# Patient Record
Sex: FEMALE | Race: BLACK OR AFRICAN AMERICAN | URBAN - METROPOLITAN AREA
[De-identification: names, ages, dates, MRNs, and addresses within clinical notes are randomized per-mention and may not be internally consistent; named-entity substitution may affect disease eponyms.]

---

## 2021-08-23 ENCOUNTER — OFFICE VISIT (OUTPATIENT)
Dept: URBAN - METROPOLITAN AREA CLINIC 23 | Facility: CLINIC | Age: 66
End: 2021-08-23
Payer: MEDICARE

## 2021-08-23 ENCOUNTER — DASHBOARD ENCOUNTERS (OUTPATIENT)
Age: 66
End: 2021-08-23

## 2021-08-23 ENCOUNTER — WEB ENCOUNTER (OUTPATIENT)
Dept: URBAN - METROPOLITAN AREA CLINIC 23 | Facility: CLINIC | Age: 66
End: 2021-08-23

## 2021-08-23 VITALS
WEIGHT: 162 LBS | TEMPERATURE: 98 F | BODY MASS INDEX: 26.03 KG/M2 | HEART RATE: 99 BPM | SYSTOLIC BLOOD PRESSURE: 136 MMHG | DIASTOLIC BLOOD PRESSURE: 82 MMHG | HEIGHT: 66 IN

## 2021-08-23 DIAGNOSIS — R10.84 ABDOMINAL CRAMPING, GENERALIZED: ICD-10-CM

## 2021-08-23 DIAGNOSIS — R63.4 WEIGHT LOSS: ICD-10-CM

## 2021-08-23 DIAGNOSIS — K59.01 CONSTIPATION: ICD-10-CM

## 2021-08-23 PROBLEM — 14760008 CONSTIPATION: Status: ACTIVE | Noted: 2021-08-23

## 2021-08-23 PROCEDURE — 74177 CT ABD & PELVIS W/CONTRAST: CPT | Performed by: INTERNAL MEDICINE

## 2021-08-23 PROCEDURE — 99244 OFF/OP CNSLTJ NEW/EST MOD 40: CPT | Performed by: INTERNAL MEDICINE

## 2021-08-23 PROCEDURE — 99204 OFFICE O/P NEW MOD 45 MIN: CPT | Performed by: INTERNAL MEDICINE

## 2021-08-23 RX ORDER — LEVOTHYROXINE SODIUM 0.05 MG/1
1 TABLET IN THE MORNING ON AN EMPTY STOMACH TABLET ORAL ONCE A DAY
Status: ACTIVE | COMMUNITY

## 2021-08-23 RX ORDER — LINACLOTIDE 290 UG/1
1 CAPSULE AT LEAST 30 MINUTES BEFORE THE FIRST MEAL OF THE DAY ON AN EMPTY STOMACH CAPSULE, GELATIN COATED ORAL ONCE A DAY
Status: ACTIVE | COMMUNITY

## 2021-08-23 RX ORDER — HYDROCHLOROTHIAZIDE 25 MG/1
1 TABLET IN THE MORNING TABLET ORAL ONCE A DAY
Status: ACTIVE | COMMUNITY

## 2021-08-23 RX ORDER — LISINOPRIL 40 MG/1
1 TABLET TABLET ORAL ONCE A DAY
Status: ACTIVE | COMMUNITY

## 2021-08-23 RX ORDER — FEBUXOSTAT 40 MG/1
1 TABLET TABLET ORAL ONCE A DAY
Status: ACTIVE | COMMUNITY

## 2021-08-23 RX ORDER — DAPAGLIFLOZIN 10 MG/1
1 TABLET TABLET, FILM COATED ORAL ONCE A DAY
Status: ACTIVE | COMMUNITY

## 2021-08-23 RX ORDER — GLIPIZIDE 5 MG/1
1 TABLET WITH FOOD TABLET, EXTENDED RELEASE ORAL ONCE A DAY
Status: ACTIVE | COMMUNITY

## 2021-08-23 RX ORDER — PAROXETINE HYDROCHLORIDE HEMIHYDRATE 30 MG/1
1 TABLET IN THE MORNING TABLET, FILM COATED ORAL ONCE A DAY
Status: ACTIVE | COMMUNITY

## 2021-08-23 NOTE — PHYSICAL EXAM PSYCH:
Telephone Encounter by Jatin Espinal MD at 09/07/17 08:35 AM     Author:  Jatin Espinal MD Service:  (none) Author Type:  Physician     Filed:  09/07/17 08:35 AM Encounter Date:  9/6/2017 Status:  Signed     :  Jatin Espinal MD (Physician)            [facility] for one refill[RH1.1M]      Revision History        User Key Date/Time User Provider Type Action    > RH1.1 09/07/17 08:35 AM Jatin Espinal MD Physician Sign    M - Manual normal mood with appropriate affect

## 2021-08-23 NOTE — PHYSICAL EXAM CHEST:
no lesions, no deformities, no traumatic injuries, no significant scars are present, chest wall non-tender, no masses present, tactile fremitus is normal, breathing is unlabored without accessory muscle use., normal breath sounds. , no lesions,  no deformities,  no traumatic injuries,  no significant scars are present,  chest wall non-tender, breathing is unlabored without accessory muscle use,normal breath sounds

## 2021-08-23 NOTE — HPI-TODAY'S VISIT:
67 yo female presenting for abdominal discomfort/gas and bloating referred by Dr. Paula Peñaloza .  A copy of this note will be sent to the referring physician.   -she feels that the symptoms started about 4 months ago generally -however prior to that has had longstanding constipation took pepcid which helped a little -started having pain in the mid abdomen- this is daily basis, it is a dull pain .  she states that it is improved with bm.  when she eats it is worse.  she doesn't notice any specific triggers.  she has tried bland food, still is present.  she feels that the pain is increasing -she was placed on linzess 290 which gave her diarrhea.  she hasn't yet gotten a lower dose- has not picked it up -started this about 60 days ago -she complains of a lot of bloating and gas- states that this is new for her as well.   she does feel that the linzess and resulting emptying helps the bloating gas but it comes pretty quickly  -no blood in the stool or black stool -she has gotten her routine bloodwork with Dr. Lamar, states that her thyroid has been checked -she has  her uterus and ovaries- sees gyn but hasn't this year -denies any family history of any intestinal issues -she states that there were no new prescriptions that she started before these symptoms started -takes melatonin to help her sleep which is new  -she has held her low dose asa, farxiga per discussion with Dr. Lamar which didn't help - she had a colonoscopy  6 years ago- diverticulosis was seen and no other findings -she feels that the pepcid chewable that she is nydia twice a day has helped /gotten rid of the pain altogether -she states that she has lost 94 pounds over 1 years - initially on purpose but states that it continued without intention -feels that she doesn't have the same appetite- when she does eat she has the pain later on

## 2021-08-23 NOTE — PHYSICAL EXAM GASTROINTESTINAL
Abdomen,  soft, diffusely tender to palpation with voluntary guarding , patient visibly uncomfortable, nondistended,  no guarding or rigidity,  no masses palpable,  normal bowel sounds , Abdomen , soft, nontender, nondistended , no guarding or rigidity , no masses palpable , normal bowel sounds , Liver and Spleen , no hepatomegaly present , no hepatosplenomegaly , liver nontender , spleen not palpable

## 2021-08-26 LAB
DEAMIDATED GLIADIN ABS, IGA: 5
DEAMIDATED GLIADIN ABS, IGG: 2
ENDOMYSIAL ANTIBODY IGA: NEGATIVE
IMMUNOGLOBULIN A, QN, SERUM: 304
T-TRANSGLUTAMINASE (TTG) IGA: <2
T-TRANSGLUTAMINASE (TTG) IGG: <2

## 2021-08-30 ENCOUNTER — TELEPHONE ENCOUNTER (OUTPATIENT)
Dept: URBAN - METROPOLITAN AREA CLINIC 92 | Facility: CLINIC | Age: 66
End: 2021-08-30

## 2021-08-31 ENCOUNTER — TELEPHONE ENCOUNTER (OUTPATIENT)
Dept: URBAN - METROPOLITAN AREA CLINIC 49 | Facility: CLINIC | Age: 66
End: 2021-08-31

## 2021-08-31 ENCOUNTER — LAB OUTSIDE AN ENCOUNTER (OUTPATIENT)
Dept: URBAN - METROPOLITAN AREA CLINIC 23 | Facility: CLINIC | Age: 66
End: 2021-08-31

## 2021-08-31 LAB
CREATININE POC: 0.7
PERFORMING LAB: (no result)

## 2021-09-01 PROBLEM — 425810000: Status: ACTIVE | Noted: 2021-09-01

## 2021-09-02 ENCOUNTER — TELEPHONE ENCOUNTER (OUTPATIENT)
Dept: URBAN - METROPOLITAN AREA CLINIC 12 | Facility: CLINIC | Age: 66
End: 2021-09-02